# Patient Record
Sex: FEMALE | Race: WHITE | NOT HISPANIC OR LATINO | URBAN - NONMETROPOLITAN AREA
[De-identification: names, ages, dates, MRNs, and addresses within clinical notes are randomized per-mention and may not be internally consistent; named-entity substitution may affect disease eponyms.]

---

## 2019-07-02 ENCOUNTER — IMPORTED ENCOUNTER (OUTPATIENT)
Dept: URBAN - NONMETROPOLITAN AREA CLINIC 1 | Facility: CLINIC | Age: 74
End: 2019-07-02

## 2019-07-02 PROBLEM — E11.9: Noted: 2019-07-02

## 2019-07-02 PROBLEM — H35.051: Noted: 2019-07-02

## 2019-07-02 PROBLEM — Z96.1: Noted: 2019-07-02

## 2019-07-02 PROBLEM — H35.3130: Noted: 2019-07-02

## 2019-07-02 PROBLEM — H43.11: Noted: 2019-07-02

## 2019-07-02 PROCEDURE — 92014 COMPRE OPH EXAM EST PT 1/>: CPT

## 2019-07-02 NOTE — PATIENT DISCUSSION
*Vitreous Hemorrhage OD-  Discussed findings of exam in detail with the patient.-The risk of retinal detachment in patients with PVDs was discussed with the patient and the warning signs of retinal detachment were carefully reviewed with the patient. - The patient was warned to return to the office or contact the ophthalmologist on call immediately if they experience signs of retinal detachment. ARMD: OU R>L - followed by ZEINA ongoing Avastin OD -  Keep appts with [facility] patient will see [facility] july 16th- Continue Areds 2 consider [facility] Areds 2 if pt wishes. - Continue to check amsler grid dailyPC IOL OUS/P YAG PC OU DM s DR-Stressed the importance of keeping blood sugars and BP under control and regular visits with PCP. -Explained the possible effects of poorly controlled diabetes and the damage that diabetes can cause to ocular health. -Pt instructed to contact our office with any vision changes.; Dr's Notes: OVERDUE FOR VF (1/3/13) AND HRT (10/29/12)

## 2020-07-14 ENCOUNTER — IMPORTED ENCOUNTER (OUTPATIENT)
Dept: URBAN - NONMETROPOLITAN AREA CLINIC 1 | Facility: CLINIC | Age: 75
End: 2020-07-14

## 2020-07-14 PROBLEM — H35.3130: Noted: 2020-07-14

## 2020-07-14 PROBLEM — Z96.1: Noted: 2020-07-14

## 2020-07-14 PROBLEM — E11.9: Noted: 2020-07-14

## 2020-07-14 PROCEDURE — 92014 COMPRE OPH EXAM EST PT 1/>: CPT

## 2020-07-14 NOTE — PATIENT DISCUSSION
ARMD: OU R>L - followed by ZEINA ongoing Avastin OD -  Keep appts with [facility] patient will see [facility] july 16th- Continue Areds 2 consider [facility] Areds 2 if pt wishes. - Continue to check amsler grid dailyPC IOL OUS/P YAG PC OU monitorDM s DR-Stressed the importance of keeping blood sugars and BP under control and regular visits with PCP. -Explained the possible effects of poorly controlled diabetes and the damage that diabetes can cause to ocular health. -Pt instructed to contact our office with any vision changes.; Dr's Notes: OVERDUE FOR VF (1/3/13) AND HRT (10/29/12)

## 2022-04-16 ASSESSMENT — TONOMETRY
OS_IOP_MMHG: 21
OD_IOP_MMHG: 20
OS_IOP_MMHG: 20
OD_IOP_MMHG: 20

## 2022-04-16 ASSESSMENT — VISUAL ACUITY
OS_SC: 20/25-2
OS_SC: 20/25
OD_SC: 20/25
OD_SC: 20/25